# Patient Record
Sex: MALE | Race: WHITE | NOT HISPANIC OR LATINO | Employment: FULL TIME | URBAN - METROPOLITAN AREA
[De-identification: names, ages, dates, MRNs, and addresses within clinical notes are randomized per-mention and may not be internally consistent; named-entity substitution may affect disease eponyms.]

---

## 2018-08-29 ENCOUNTER — APPOINTMENT (OUTPATIENT)
Dept: RADIOLOGY | Facility: MEDICAL CENTER | Age: 38
End: 2018-08-29
Attending: RADIOLOGY
Payer: MEDICAID

## 2018-08-29 ENCOUNTER — APPOINTMENT (OUTPATIENT)
Dept: RADIOLOGY | Facility: MEDICAL CENTER | Age: 38
End: 2018-08-29
Attending: EMERGENCY MEDICINE
Payer: MEDICAID

## 2018-08-29 ENCOUNTER — HOSPITAL ENCOUNTER (EMERGENCY)
Facility: MEDICAL CENTER | Age: 38
End: 2018-08-30
Attending: EMERGENCY MEDICINE | Admitting: ORTHOPAEDIC SURGERY
Payer: MEDICAID

## 2018-08-29 DIAGNOSIS — S62.101A CLOSED FRACTURE OF RIGHT WRIST, INITIAL ENCOUNTER: ICD-10-CM

## 2018-08-29 DIAGNOSIS — Z20.2 STD EXPOSURE: ICD-10-CM

## 2018-08-29 PROCEDURE — 160028 HCHG SURGERY MINUTES - 1ST 30 MINS LEVEL 3: Performed by: ORTHOPAEDIC SURGERY

## 2018-08-29 PROCEDURE — 160048 HCHG OR STATISTICAL LEVEL 1-5: Performed by: ORTHOPAEDIC SURGERY

## 2018-08-29 PROCEDURE — 73100 X-RAY EXAM OF WRIST: CPT | Mod: RT

## 2018-08-29 PROCEDURE — 160009 HCHG ANES TIME/MIN: Performed by: ORTHOPAEDIC SURGERY

## 2018-08-29 PROCEDURE — 700111 HCHG RX REV CODE 636 W/ 250 OVERRIDE (IP): Performed by: EMERGENCY MEDICINE

## 2018-08-29 PROCEDURE — 500881 HCHG PACK, EXTREMITY: Performed by: ORTHOPAEDIC SURGERY

## 2018-08-29 PROCEDURE — 73110 X-RAY EXAM OF WRIST: CPT | Mod: RT

## 2018-08-29 PROCEDURE — 87591 N.GONORRHOEAE DNA AMP PROB: CPT

## 2018-08-29 PROCEDURE — 160035 HCHG PACU - 1ST 60 MINS PHASE I: Performed by: ORTHOPAEDIC SURGERY

## 2018-08-29 PROCEDURE — 160002 HCHG RECOVERY MINUTES (STAT): Performed by: ORTHOPAEDIC SURGERY

## 2018-08-29 PROCEDURE — 160039 HCHG SURGERY MINUTES - EA ADDL 1 MIN LEVEL 3: Performed by: ORTHOPAEDIC SURGERY

## 2018-08-29 PROCEDURE — 160036 HCHG PACU - EA ADDL 30 MINS PHASE I: Performed by: ORTHOPAEDIC SURGERY

## 2018-08-29 PROCEDURE — A9270 NON-COVERED ITEM OR SERVICE: HCPCS

## 2018-08-29 PROCEDURE — 160022 HCHG BLOCK: Performed by: ORTHOPAEDIC SURGERY

## 2018-08-29 PROCEDURE — 501838 HCHG SUTURE GENERAL: Performed by: ORTHOPAEDIC SURGERY

## 2018-08-29 PROCEDURE — 501445 HCHG STAPLER, SKIN DISP: Performed by: ORTHOPAEDIC SURGERY

## 2018-08-29 PROCEDURE — 700111 HCHG RX REV CODE 636 W/ 250 OVERRIDE (IP)

## 2018-08-29 PROCEDURE — 96374 THER/PROPH/DIAG INJ IV PUSH: CPT

## 2018-08-29 PROCEDURE — 700102 HCHG RX REV CODE 250 W/ 637 OVERRIDE(OP)

## 2018-08-29 PROCEDURE — C1713 ANCHOR/SCREW BN/BN,TIS/BN: HCPCS | Performed by: ORTHOPAEDIC SURGERY

## 2018-08-29 PROCEDURE — 87491 CHLMYD TRACH DNA AMP PROBE: CPT

## 2018-08-29 PROCEDURE — 99291 CRITICAL CARE FIRST HOUR: CPT

## 2018-08-29 PROCEDURE — 96375 TX/PRO/DX INJ NEW DRUG ADDON: CPT

## 2018-08-29 DEVICE — SCREW 3.5 MM NON-LOCKING TI X 18MM LONG (6TX8+2TX5=58): Type: IMPLANTABLE DEVICE | Status: FUNCTIONAL

## 2018-08-29 DEVICE — SCREW 2.5 MM NON-LOCKING TI X 20MM LONG (6TX8=48): Type: IMPLANTABLE DEVICE | Status: FUNCTIONAL

## 2018-08-29 DEVICE — SCREW 3.5 MM NON-LOCKING TI X 16MM LONG (6TX8+2TX5=58): Type: IMPLANTABLE DEVICE | Status: FUNCTIONAL

## 2018-08-29 DEVICE — SCREW 3.5 MM NON-LOCKING TI X 14MM LONG (6TX8+2TX5=58): Type: IMPLANTABLE DEVICE | Status: FUNCTIONAL

## 2018-08-29 DEVICE — SCREW 2.5 MM LOCKING TI X 22MM LONG (6TX8=48): Type: IMPLANTABLE DEVICE | Status: FUNCTIONAL

## 2018-08-29 DEVICE — PLATE VOLAR NARROW RIGHT 3HX20X44MM (2TX2=4): Type: IMPLANTABLE DEVICE | Status: FUNCTIONAL

## 2018-08-29 DEVICE — SCREW 2.5 MM LOCKING TI X 20MM LONG (6TX8=48): Type: IMPLANTABLE DEVICE | Status: FUNCTIONAL

## 2018-08-29 RX ORDER — ONDANSETRON 2 MG/ML
4 INJECTION INTRAMUSCULAR; INTRAVENOUS ONCE
Status: COMPLETED | OUTPATIENT
Start: 2018-08-29 | End: 2018-08-29

## 2018-08-29 RX ORDER — MORPHINE SULFATE 4 MG/ML
4 INJECTION, SOLUTION INTRAMUSCULAR; INTRAVENOUS ONCE
Status: COMPLETED | OUTPATIENT
Start: 2018-08-29 | End: 2018-08-29

## 2018-08-29 RX ORDER — BUSPIRONE HYDROCHLORIDE 10 MG/1
10 TABLET ORAL 3 TIMES DAILY
COMMUNITY
End: 2020-01-23

## 2018-08-29 RX ORDER — BUPRENORPHINE HYDROCHLORIDE AND NALOXONE HYDROCHLORIDE DIHYDRATE 8; 2 MG/1; MG/1
1 TABLET SUBLINGUAL DAILY
COMMUNITY
End: 2020-01-23

## 2018-08-29 RX ORDER — CEFTRIAXONE SODIUM 250 MG/1
250 INJECTION, POWDER, FOR SOLUTION INTRAMUSCULAR; INTRAVENOUS ONCE
Status: DISCONTINUED | OUTPATIENT
Start: 2018-08-29 | End: 2018-08-29

## 2018-08-29 RX ORDER — AZITHROMYCIN 500 MG/1
1000 TABLET, FILM COATED ORAL ONCE
Qty: 2 TAB | Refills: 0 | Status: SHIPPED | OUTPATIENT
Start: 2018-08-29 | End: 2018-08-29

## 2018-08-29 RX ORDER — OXYCODONE HYDROCHLORIDE AND ACETAMINOPHEN 5; 325 MG/1; MG/1
TABLET ORAL
Status: COMPLETED
Start: 2018-08-29 | End: 2018-08-29

## 2018-08-29 RX ADMIN — MORPHINE SULFATE 4 MG: 4 INJECTION INTRAVENOUS at 22:15

## 2018-08-29 RX ADMIN — OXYCODONE AND ACETAMINOPHEN 2 TABLET: 5; 325 TABLET ORAL at 23:50

## 2018-08-29 RX ADMIN — FENTANYL CITRATE 50 MCG: 50 INJECTION, SOLUTION INTRAMUSCULAR; INTRAVENOUS at 23:54

## 2018-08-29 RX ADMIN — ONDANSETRON HYDROCHLORIDE 4 MG: 2 INJECTION, SOLUTION INTRAMUSCULAR; INTRAVENOUS at 22:15

## 2018-08-29 ASSESSMENT — PAIN SCALES - GENERAL
PAINLEVEL_OUTOF10: 7
PAINLEVEL_OUTOF10: 7
PAINLEVEL_OUTOF10: 8

## 2018-08-30 VITALS
DIASTOLIC BLOOD PRESSURE: 76 MMHG | OXYGEN SATURATION: 92 % | SYSTOLIC BLOOD PRESSURE: 102 MMHG | TEMPERATURE: 99 F | WEIGHT: 130 LBS | RESPIRATION RATE: 22 BRPM | HEART RATE: 65 BPM

## 2018-08-30 LAB
C TRACH DNA SPEC QL NAA+PROBE: NEGATIVE
N GONORRHOEA DNA SPEC QL NAA+PROBE: POSITIVE
SPECIMEN SOURCE: ABNORMAL

## 2018-08-30 PROCEDURE — 700111 HCHG RX REV CODE 636 W/ 250 OVERRIDE (IP)

## 2018-08-30 RX ORDER — HYDROMORPHONE HYDROCHLORIDE 2 MG/ML
INJECTION, SOLUTION INTRAMUSCULAR; INTRAVENOUS; SUBCUTANEOUS
Status: COMPLETED
Start: 2018-08-30 | End: 2018-08-30

## 2018-08-30 RX ADMIN — HYDROMORPHONE HYDROCHLORIDE 0.5 MG: 2 INJECTION INTRAMUSCULAR; INTRAVENOUS; SUBCUTANEOUS at 01:07

## 2018-08-30 RX ADMIN — FENTANYL CITRATE 50 MCG: 50 INJECTION, SOLUTION INTRAMUSCULAR; INTRAVENOUS at 00:00

## 2018-08-30 RX ADMIN — HYDROMORPHONE HYDROCHLORIDE 0.5 MG: 2 INJECTION INTRAMUSCULAR; INTRAVENOUS; SUBCUTANEOUS at 00:09

## 2018-08-30 RX ADMIN — HYDROMORPHONE HYDROCHLORIDE 0.5 MG: 2 INJECTION INTRAMUSCULAR; INTRAVENOUS; SUBCUTANEOUS at 00:19

## 2018-08-30 RX ADMIN — HYDROMORPHONE HYDROCHLORIDE 0.5 MG: 2 INJECTION INTRAMUSCULAR; INTRAVENOUS; SUBCUTANEOUS at 00:50

## 2018-08-30 RX ADMIN — HYDROMORPHONE HYDROCHLORIDE 0.5 MG: 2 INJECTION INTRAMUSCULAR; INTRAVENOUS; SUBCUTANEOUS at 00:14

## 2018-08-30 RX ADMIN — HYDROMORPHONE HYDROCHLORIDE 0.5 MG: 2 INJECTION INTRAMUSCULAR; INTRAVENOUS; SUBCUTANEOUS at 00:40

## 2018-08-30 RX ADMIN — HYDROMORPHONE HYDROCHLORIDE 0.5 MG: 2 INJECTION INTRAMUSCULAR; INTRAVENOUS; SUBCUTANEOUS at 00:28

## 2018-08-30 ASSESSMENT — PAIN SCALES - GENERAL
PAINLEVEL_OUTOF10: 5
PAINLEVEL_OUTOF10: 3
PAINLEVEL_OUTOF10: 3
PAINLEVEL_OUTOF10: 7
PAINLEVEL_OUTOF10: 6
PAINLEVEL_OUTOF10: 8

## 2018-08-30 NOTE — OR NURSING
Pt A&O. VSS on RA. Pt reports pain tolerable and denies nausea. Right arm in sling per orders. Surgical drsg CDI. CMS intact in right fingers.   Pt ambulating without issues. Voided x1. Tolerating PO.  Pt in police custody at time of d/c.   Pt ambulated off of unit accompanied by 2 Police officers and 1 .

## 2018-08-30 NOTE — DISCHARGE INSTRUCTIONS
Extremity Fracture  Broken bones (fractures) take several weeks to months to heal depending on the bone involved. The broken ends must be lined up correctly and kept in position for proper healing. Do not remove the splint, immobilizer, or cast that has been applied to treat your injury. This is the most important part of your treatment. Other measures to treat fractures include:  · Keeping the injured limb at rest and elevated above your heart as recommended by your caregiver. This will help reduce pain and swelling.   · Ice packs can be applied to your fracture site for 20-30 minutes every 3-4 hours over the next 2-3 days.   · Pain medicine may be prescribed in the first days after a fracture.   SEEK IMMEDIATE MEDICAL CARE IF:  · You develop increasing pain or pressure in the injured limb, or if it becomes cold, numb, or pale.   · There is increasing pain with motion of your fingers or toes.   Document Released: 01/25/2006 Document Revised: 03/11/2013 Document Reviewed: 04/07/2010  [employer]® Patient Information ©2013 [employer]sChlamydia, Male  Chlamydia is an infection. It is spread through sexual contact. Chlamydia can be in different areas of the body. These areas include the urethra, throat, or rectum. It is important to treat chlamydia as soon as possible. It can damage other organs.   CAUSES   Chlamydia is caused by bacteria. It is a sexually transmitted disease. This means that it is passed from an infected partner during intimate contact. This contact could be with the genitals, mouth, or rectal area.   SIGNS AND SYMPTOMS   There may not be any symptoms. This is often the case early in the infection. If there are symptoms, they are usually mild and may only be noticeable in the morning. Symptoms you may notice include:   · Burning with urination.  · Pain or swelling in the testicles.  · Watery mucus-like discharge from the penis.  · Long-standing (chronic) pelvic pain after frequent  infections.  · Pain, swelling, or itching around the anus.  · A sore throat.  · Itching, burning, or redness in the eyes, or discharge from the eyes.  DIAGNOSIS   To diagnose this infection, your health care provider will do a pelvic exam. A sample of urine or a swab from the rectum may be taken for testing.   TREATMENT   Chlamydia is treated with antibiotic medicines. Your health care provider may test you for infection again 3 months after treatment.  HOME CARE INSTRUCTIONS  · Take your antibiotic medicine as directed by your health care provider. Finish the antibiotic even if you start to feel better. Incomplete treatment will put you at risk for not being able to have children (sterility).    · Take medicines only as directed by your health care provider.    · Rest.    · Inform any sexual partners about your infection. Even if they are symptom free or have a negative culture or evaluation, they should be treated for the condition.    · Do not have sex (intercourse) until treatment is completed and your health care provider says it is okay.    · Keep all follow-up visits as directed by your health care provider.    · Not all test results are available during your visit. If your test results are not back during the visit, make an appointment with your health care provider to find out the results. Do not assume everything is normal if you have not heard from your health care provider or the medical facility. It is your responsibility to get your test results.  SEEK MEDICAL CARE IF:  · You develop new joint pain.  · You have a fever.  SEEK IMMEDIATE MEDICAL CARE IF:   · Your pain increases.    · You have abnormal discharge.    · You have pain during intercourse.  MAKE SURE YOU:   · Understand these instructions.  · Will watch your condition.  · Will get help right away if you are not doing well or get worse.  This information is not intended to replace advice given to you by your health care provider. Make sure you  discuss any questions you have with your health care provider.  Document Released: 2006 Document Revised: 2016 Document Reviewed: 2014  [employer] Interactive Patient Education © 2017 [employer]  Gonorrhea  Gonorrhea is an infection that can cause serious problems. If left untreated, the infection may:   · Damage the female or male organs.    · Cause women to be unable to have children (sterility).    · Harm a fetus if the infected woman is pregnant.    It is important to get treatment for gonorrhea as soon as possible. It is also necessary that all your sexual partners be tested for the infection.   CAUSES   Gonorrhea is caused by bacteria called Neisseria gonorrhoeae. The infection is spread from person to person, usually by sexual contact (such as by anal, vaginal, or oral means). A  can contract the infection from his or her mother during birth.   RISK FACTORS  · Being a woman younger than 25 years of age who is sexually active.  · Being a woman 25 years of age or older who has:  ¨ A new sex partner.  ¨ More than one sex partner.  ¨ A sex partner who has a sexually transmitted disease (STD).  · Using condoms inconsistently.  · Currently having, or having previously had, an STD.  · Exchanging sex or money or drugs.  SYMPTOMS   Some people with gonorrhea do not have symptoms. Symptoms may be different in females and males.   Females   The most common symptoms are:   · Pain in the lower abdomen.    · Fever with or without chills.    Other symptoms include:   · Abnormal vaginal discharge.    · Painful intercourse.    · Burning or itching of the vagina or lips of the vagina.    · Abnormal vaginal bleeding.    · Pain when urinating.    · Long-lasting (chronic) pain in the lower abdomen, especially during menstruation or intercourse.    · Inability to become pregnant.    · Going into premature labor.    · Irritation, pain, bleeding, or discharge from the rectum. This may occur if the infection  was spread by anal sex.    · Sore throat or swollen lymph nodes in the neck. This may occur if the infection was spread by oral sex.    Males   The most common symptoms are:   · Discharge from the penis.    · Pain or burning during urination.    · Pain or swelling in the testicles.  Other symptoms may include:   · Irritation, pain, bleeding, or discharge from the rectum. This may occur if the infection was spread by anal sex.    · Sore throat, fever, or swollen lymph nodes in the neck. This may occur if the infection was spread by oral sex.    DIAGNOSIS   A diagnosis is made after a physical exam is done and a sample of discharge is examined under a microscope for the presence of the bacteria. The discharge may be taken from the urethra, cervix, throat, or rectum.   TREATMENT   Gonorrhea is treated with antibiotic medicines. It is important for treatment to begin as soon as possible. Early treatment may prevent some problems from developing. Do not have sex. Avoid all types of sexual activity for 7 days after treatment is complete and until any sex partners have been treated.  HOME CARE INSTRUCTIONS   · Take medicines only as directed by your health care provider.    · Take your antibiotic medicine as directed by your health care provider. Finish the antibiotic even if you start to feel better. Incomplete treatment will put you at risk for continued infection.    · Do not have sex until treatment is complete or as directed by your health care provider.    · Keep all follow-up visits as directed by your health care provider.    · Not all test results are available during your visit. If your test results are not back during the visit, make an appointment with your health care provider to find out the results. Do not assume everything is normal if you have not heard from your health care provider or the medical facility. It is your responsibility to get your test results.  · If you test positive for gonorrhea, inform  your recent sexual partners. They need to be checked for gonorrhea even if they do not have symptoms. They may need treatment, even if they test negative for gonorrhea.    SEEK MEDICAL CARE IF:   · You develop any bad reaction to the medicine you were prescribed. This may include:    ¨ A rash.    ¨ Nausea.    ¨ Vomiting.    ¨ Diarrhea.    · Your symptoms do not improve after a few days of taking antibiotics.    · Your symptoms get worse.    · You develop increased pain, such as in the testicles (for males) or in the abdomen (for females).  · You have a fever.  MAKE SURE YOU:   · Understand these instructions.  · Will watch your condition.  · Will get help right away if you are not doing well or get worse.  This information is not intended to replace advice given to you by your health care provider. Make sure you discuss any questions you have with your health care provider.  Document Released: 12/15/2001 Document Revised: 01/08/2016 Document Reviewed: 06/25/2014  [employer] Interactive Patient Education © 2017 [employer]    ACTIVITY: Rest and take it easy for the first 24 hours.  A responsible adult is recommended to remain with you during that time.  It is normal to feel sleepy.  We encourage you to not do anything that requires balance, judgment or coordination.    MILD FLU-LIKE SYMPTOMS ARE NORMAL. YOU MAY EXPERIENCE GENERALIZED MUSCLE ACHES, THROAT IRRITATION, HEADACHE AND/OR SOME NAUSEA.    FOR 24 HOURS DO NOT:  Drive, operate machinery or run household appliances.  Drink beer or alcoholic beverages.   Make important decisions or sign legal documents.                   SPECIAL INSTRUCTIONS: DISCHARGE INSTRUCTIONS:    ACTIVITY:  The patient should remain non weightbearing with the use of sling    PAIN CONTROL:  The patient has been prescribed a narcotic pain medication.  Side effects of narcotics pain medications include sedation and constipation.  To prevent constipation, it is recommended that the patient  take an over the counter stool softener (such as Colace or Senna) and use laxatives as needed to prevent constipation.  Take these over the counter medications as directed by the packaging.  The patient may not drive while taking narcotic pain medication.  The patient should wean off their prescription pain medication by taking over the counter analgesics (such as Tylenol, Advil, Ibuprofen, etc).  Use caution when taking acetaminophen (Tylenol), as some narcotic medications contain acetaminophen.  The total dose of acetaminophen should not exceed 3000 mg daily.    WOUND CARE:  The patient has a surgical wound which was closed with staples or sutures.  These need to be removed 10-14 days after surgery.  If the patient is not in a splint or cast, the operative dressing should be removed 2 days after surgery, and dry gauze dressing changes should be performed daily after that.  You may shower when the operative dressing is removed.    SPLINT/CAST CARE:  If present, you should keep your splint or cast clean, dry, and intact.  You should elevate your operative extremity to minimize swelling in your fingers or toes.  If the sensation in your fingers or toes of your operative extremity changes, or the fingers/toes change colors, or should your pain become too difficult to control, you should immediately elevate your operative extremity.  If these symptoms do not resolve after 30 minutes to 1 hour, you should seek medical care immediately.    CALL YOUR DOCTOR IF:  You have fever >101.5 degrees F, you have increased or concerning wound drainage, you notice a great deal of redness around your incision, your pain can no longer be controlled, the sensation in your fingers or toes changes and does not respond to elevation, your cast or splint becomes saturated (wet) or other concerns.  If you suffer a medical emergency, seek immediate medical care at your nearest Emergency Room.    DIET: To avoid nausea, slowly advance diet as  tolerated, avoiding spicy or greasy foods for the first day.  Add more substantial food to your diet according to your physician's instructions.  Babies can be fed formula or breast milk as soon as they are hungry.  INCREASE FLUIDS AND FIBER TO AVOID CONSTIPATION.    SURGICAL DRESSING/BATHING: See Above    FOLLOW-UP APPOINTMENT:  A follow-up appointment should be arranged with your doctor; call to schedule.    You should CALL YOUR PHYSICIAN if you develop:  Fever greater than 101 degrees F.  Pain not relieved by medication, or persistent nausea or vomiting.  Excessive bleeding (blood soaking through dressing) or unexpected drainage from the wound.  Extreme redness or swelling around the incision site, drainage of pus or foul smelling drainage.  Inability to urinate or empty your bladder within 8 hours.  Problems with breathing or chest pain.    You should call 911 if you develop problems with breathing or chest pain.  If you are unable to contact your doctor or surgical center, you should go to the nearest emergency room or urgent care center.  Physician's telephone #: [phone]    If any questions arise, call your doctor.  If your doctor is not available, please feel free to call the Surgical Center at [phone].  The Center is open Monday through Friday from 7AM to 7PM.  You can also call the [facility] HOTLINE open 24 hours/day, 7 days/week and speak to a nurse at [phone], or toll free at [phone].    A registered nurse may call you a few days after your surgery to see how you are doing after your procedure.    MEDICATIONS: Resume taking daily medication.  Take prescribed pain medication with food.  If no medication is prescribed, you may take non-aspirin pain medication if needed.  PAIN MEDICATION CAN BE VERY CONSTIPATING.  Take a stool softener or laxative such as senokot, pericolace, or milk of magnesia if needed.    Prescription given for N/A.  Last oral pain medication given at 11:50. 2  percocet    If your physician has prescribed pain medication that includes Acetaminophen (Tylenol), do not take additional Acetaminophen (Tylenol) while taking the prescribed medication.    Depression / Suicide Risk    As you are discharged from this [facility] facility, it is important to learn how to keep safe from harming yourself.    Recognize the warning signs:  · Abrupt changes in personality, positive or negative- including increase in energy   · Giving away possessions  · Change in eating patterns- significant weight changes-  positive or negative  · Change in sleeping patterns- unable to sleep or sleeping all the time   · Unwillingness or inability to communicate  · Depression  · Unusual sadness, discouragement and loneliness  · Talk of wanting to die  · Neglect of personal appearance   · Rebelliousness- reckless behavior  · Withdrawal from people/activities they love  · Confusion- inability to concentrate     If you or a loved one observes any of these behaviors or has concerns about self-harm, here's what you can do:  · Talk about it- your feelings and reasons for harming yourself  · Remove any means that you might use to hurt yourself (examples: pills, rope, extension cords, firearm)  · Get professional help from the community (Mental Health, Substance Abuse, psychological counseling)  · Do not be alone:Call your Safe Contact- someone whom you trust who will be there for you.  · Call your local CRISIS HOTLINE 059-5428 or 380-688-1248  · Call your local Children's Mobile Crisis Response Team [place] (677) 544-0085 or www.lettrs  · Call the toll free National Suicide Prevention Hotlines   · National Suicide Prevention Lifeline 667-872-YWVD (9255)  · National Hope Line Network 800-SUICIDE (060-6215)
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
## 2018-08-30 NOTE — CONSULTS
8/29/2018    Sam Talbot is a 38 y.o. male who presents with a right wrist fracture and is here for operative management. Patient denies numbness, parasthesias, loss of concousness or other trauma    No past medical history on file.    No past surgical history on file.    Medications  No current facility-administered medications on file prior to encounter.      Current Outpatient Prescriptions on File Prior to Encounter   Medication Sig Dispense Refill   • METHADONE by Does not apply route.         Allergies  Iodine    ROS  Right wrist pain and deformity. All other systems were reviewed and found to be negative    No family history on file.    Social History     Social History   • Marital status:      Spouse name: N/A   • Number of children: N/A   • Years of education: N/A     Social History Main Topics   • Smoking status: Not on file   • Smokeless tobacco: Not on file   • Alcohol use Not on file   • Drug use: Unknown   • Sexual activity: Not on file     Other Topics Concern   • Not on file     Social History Narrative   • No narrative on file       Physical Exam  Vitals  Blood pressure (!) 93/74, pulse 68, temperature 36.4 °C (97.5 °F), resp. rate 16, weight 59 kg (130 lb), SpO2 96 %.  General: Well Developed, Well Nourished, no acute distress  HEENT: Normocephalic, atraumatic  Eyes: Anicteric, PERRLA, EOMI  Neck: Supple, nontender, no masses  Lungs: CTA, no wheezes or crackles  Heart: RRR, no murmurs, rubs or gallops  Abdomen: Soft, NT, ND  Pelvis: Stable to AP and Lateral Compression  Skin: Intact, no open wounds  Extremities: Right wrist pain and deformity  Neuro: M/R/U/A intact  Vascular: 2+rad/Uln, Capillary refill <2 seconds    Radiographs:  DX-WRIST-COMPLETE 3+ RIGHT   Final Result      Fractures of the distal radius and ulna      DX-WRIST-LIMITED 2- RIGHT    (Results Pending)   DX-PORTABLE FLUOROSCOPY < 1 HOUR    (Results Pending)       Laboratory Values      No results for input(s):  SODIUM, POTASSIUM, CHLORIDE, CO2, GLUCOSE, BUN, CPKTOTAL in the last 72 hours.          Impression: Right distal radius and ulna fractures    Plan:Operative intervention. Risks and benefits of surgery were discussed which include but are not limited to bleeding, infection, neurovascular damage, malunion, nonunion, instability, limb length discrepancy, DVT, PE, MI, Stroke and death. They understand these risks and wish to proceed.
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
## 2018-08-30 NOTE — ED NOTES
Law enforcement sts they are leaving and to inform the nurses upstairs to give them a call when patient is ready for discharge post surgery / hospital stay or if the patient leaves AMA.
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
## 2020-01-23 ENCOUNTER — HOSPITAL ENCOUNTER (EMERGENCY)
Facility: MEDICAL CENTER | Age: 40
End: 2020-01-23
Attending: EMERGENCY MEDICINE
Payer: MEDICAID

## 2020-01-23 ENCOUNTER — APPOINTMENT (OUTPATIENT)
Dept: RADIOLOGY | Facility: MEDICAL CENTER | Age: 40
End: 2020-01-23
Attending: EMERGENCY MEDICINE
Payer: MEDICAID

## 2020-01-23 VITALS
TEMPERATURE: 97.4 F | SYSTOLIC BLOOD PRESSURE: 119 MMHG | HEIGHT: 73 IN | OXYGEN SATURATION: 92 % | HEART RATE: 93 BPM | DIASTOLIC BLOOD PRESSURE: 78 MMHG | WEIGHT: 126.1 LBS | BODY MASS INDEX: 16.71 KG/M2 | RESPIRATION RATE: 22 BRPM

## 2020-01-23 DIAGNOSIS — J18.9 PNEUMONIA OF RIGHT LOWER LOBE DUE TO INFECTIOUS ORGANISM: ICD-10-CM

## 2020-01-23 DIAGNOSIS — R07.81 PLEURITIC CHEST PAIN: ICD-10-CM

## 2020-01-23 LAB
ALBUMIN SERPL BCP-MCNC: 4.4 G/DL (ref 3.2–4.9)
ALBUMIN/GLOB SERPL: 1.3 G/DL
ALP SERPL-CCNC: 109 U/L (ref 30–99)
ALT SERPL-CCNC: 8 U/L (ref 2–50)
ANION GAP SERPL CALC-SCNC: 12 MMOL/L (ref 0–11.9)
AST SERPL-CCNC: 17 U/L (ref 12–45)
BASOPHILS # BLD AUTO: 0.2 % (ref 0–1.8)
BASOPHILS # BLD: 0.02 K/UL (ref 0–0.12)
BILIRUB SERPL-MCNC: 1.1 MG/DL (ref 0.1–1.5)
BUN SERPL-MCNC: 9 MG/DL (ref 8–22)
CALCIUM SERPL-MCNC: 9.6 MG/DL (ref 8.4–10.2)
CHLORIDE SERPL-SCNC: 94 MMOL/L (ref 96–112)
CO2 SERPL-SCNC: 26 MMOL/L (ref 20–33)
CREAT SERPL-MCNC: 0.64 MG/DL (ref 0.5–1.4)
D DIMER PPP IA.FEU-MCNC: 1.74 UG/ML (FEU) (ref 0–0.5)
EOSINOPHIL # BLD AUTO: 0 K/UL (ref 0–0.51)
EOSINOPHIL NFR BLD: 0 % (ref 0–6.9)
ERYTHROCYTE [DISTWIDTH] IN BLOOD BY AUTOMATED COUNT: 42.6 FL (ref 35.9–50)
GLOBULIN SER CALC-MCNC: 3.3 G/DL (ref 1.9–3.5)
GLUCOSE SERPL-MCNC: 95 MG/DL (ref 65–99)
HCT VFR BLD AUTO: 45.7 % (ref 42–52)
HGB BLD-MCNC: 15.6 G/DL (ref 14–18)
IMM GRANULOCYTES # BLD AUTO: 0.03 K/UL (ref 0–0.11)
IMM GRANULOCYTES NFR BLD AUTO: 0.3 % (ref 0–0.9)
LIPASE SERPL-CCNC: 10 U/L (ref 7–58)
LYMPHOCYTES # BLD AUTO: 0.38 K/UL (ref 1–4.8)
LYMPHOCYTES NFR BLD: 4.4 % (ref 22–41)
MCH RBC QN AUTO: 31.1 PG (ref 27–33)
MCHC RBC AUTO-ENTMCNC: 34.1 G/DL (ref 33.7–35.3)
MCV RBC AUTO: 91.2 FL (ref 81.4–97.8)
MONOCYTES # BLD AUTO: 0.29 K/UL (ref 0–0.85)
MONOCYTES NFR BLD AUTO: 3.4 % (ref 0–13.4)
NEUTROPHILS # BLD AUTO: 7.93 K/UL (ref 1.82–7.42)
NEUTROPHILS NFR BLD: 91.7 % (ref 44–72)
NRBC # BLD AUTO: 0 K/UL
NRBC BLD-RTO: 0 /100 WBC
PLATELET # BLD AUTO: 229 K/UL (ref 164–446)
PMV BLD AUTO: 9.7 FL (ref 9–12.9)
POTASSIUM SERPL-SCNC: 4 MMOL/L (ref 3.6–5.5)
PROT SERPL-MCNC: 7.7 G/DL (ref 6–8.2)
RBC # BLD AUTO: 5.01 M/UL (ref 4.7–6.1)
SODIUM SERPL-SCNC: 132 MMOL/L (ref 135–145)
WBC # BLD AUTO: 8.7 K/UL (ref 4.8–10.8)

## 2020-01-23 PROCEDURE — 99285 EMERGENCY DEPT VISIT HI MDM: CPT

## 2020-01-23 PROCEDURE — 71045 X-RAY EXAM CHEST 1 VIEW: CPT

## 2020-01-23 PROCEDURE — 85379 FIBRIN DEGRADATION QUANT: CPT

## 2020-01-23 PROCEDURE — 96375 TX/PRO/DX INJ NEW DRUG ADDON: CPT

## 2020-01-23 PROCEDURE — 700105 HCHG RX REV CODE 258: Performed by: EMERGENCY MEDICINE

## 2020-01-23 PROCEDURE — 71275 CT ANGIOGRAPHY CHEST: CPT

## 2020-01-23 PROCEDURE — 700111 HCHG RX REV CODE 636 W/ 250 OVERRIDE (IP): Performed by: EMERGENCY MEDICINE

## 2020-01-23 PROCEDURE — 85025 COMPLETE CBC W/AUTO DIFF WBC: CPT

## 2020-01-23 PROCEDURE — 36415 COLL VENOUS BLD VENIPUNCTURE: CPT

## 2020-01-23 PROCEDURE — 700117 HCHG RX CONTRAST REV CODE 255: Performed by: EMERGENCY MEDICINE

## 2020-01-23 PROCEDURE — 80053 COMPREHEN METABOLIC PANEL: CPT

## 2020-01-23 PROCEDURE — 87040 BLOOD CULTURE FOR BACTERIA: CPT | Mod: 91

## 2020-01-23 PROCEDURE — 83690 ASSAY OF LIPASE: CPT

## 2020-01-23 PROCEDURE — 96365 THER/PROPH/DIAG IV INF INIT: CPT

## 2020-01-23 RX ORDER — MORPHINE SULFATE 4 MG/ML
4 INJECTION, SOLUTION INTRAMUSCULAR; INTRAVENOUS ONCE
Status: COMPLETED | OUTPATIENT
Start: 2020-01-23 | End: 2020-01-23

## 2020-01-23 RX ORDER — AZITHROMYCIN 250 MG/1
TABLET, FILM COATED ORAL
Qty: 1 QUANTITY SUFFICIENT | Refills: 0 | Status: SHIPPED | OUTPATIENT
Start: 2020-01-23

## 2020-01-23 RX ORDER — DIPHENHYDRAMINE HYDROCHLORIDE 50 MG/ML
50 INJECTION INTRAMUSCULAR; INTRAVENOUS ONCE
Status: COMPLETED | OUTPATIENT
Start: 2020-01-23 | End: 2020-01-23

## 2020-01-23 RX ORDER — KETOROLAC TROMETHAMINE 30 MG/ML
30 INJECTION, SOLUTION INTRAMUSCULAR; INTRAVENOUS ONCE
Status: COMPLETED | OUTPATIENT
Start: 2020-01-23 | End: 2020-01-23

## 2020-01-23 RX ORDER — IBUPROFEN 800 MG/1
800 TABLET ORAL EVERY 8 HOURS PRN
COMMUNITY

## 2020-01-23 RX ORDER — BUPRENORPHINE AND NALOXONE 8; 2 MG/1; MG/1
1 FILM, SOLUBLE BUCCAL; SUBLINGUAL 2 TIMES DAILY
COMMUNITY

## 2020-01-23 RX ORDER — ONDANSETRON 2 MG/ML
4 INJECTION INTRAMUSCULAR; INTRAVENOUS ONCE
Status: COMPLETED | OUTPATIENT
Start: 2020-01-23 | End: 2020-01-23

## 2020-01-23 RX ORDER — AMOXICILLIN 500 MG/1
2000 CAPSULE ORAL 2 TIMES DAILY
Qty: 40 CAP | Refills: 0 | Status: SHIPPED | OUTPATIENT
Start: 2020-01-23 | End: 2020-01-28

## 2020-01-23 RX ADMIN — CEFTRIAXONE SODIUM 2 G: 2 INJECTION, POWDER, FOR SOLUTION INTRAMUSCULAR; INTRAVENOUS at 11:05

## 2020-01-23 RX ADMIN — HYDROCORTISONE SODIUM SUCCINATE 200 MG: 100 INJECTION, POWDER, FOR SOLUTION INTRAMUSCULAR; INTRAVENOUS at 09:07

## 2020-01-23 RX ADMIN — ONDANSETRON 4 MG: 2 INJECTION INTRAMUSCULAR; INTRAVENOUS at 08:40

## 2020-01-23 RX ADMIN — MORPHINE SULFATE 4 MG: 4 INJECTION INTRAVENOUS at 08:40

## 2020-01-23 RX ADMIN — IOHEXOL 85 ML: 350 INJECTION, SOLUTION INTRAVENOUS at 09:48

## 2020-01-23 RX ADMIN — DIPHENHYDRAMINE HYDROCHLORIDE 50 MG: 50 INJECTION INTRAMUSCULAR; INTRAVENOUS at 09:07

## 2020-01-23 RX ADMIN — KETOROLAC TROMETHAMINE 30 MG: 30 INJECTION, SOLUTION INTRAMUSCULAR at 09:59

## 2020-01-23 ASSESSMENT — LIFESTYLE VARIABLES: DO YOU DRINK ALCOHOL: NO

## 2020-01-23 NOTE — ED NOTES
Pt received d/c instr; pt verbalized understanding. Pt called dad for a ride home. Pt amb to lobby per self.

## 2020-01-23 NOTE — ED TRIAGE NOTES
"Chief Complaint   Patient presents with   • RUQ Pain     started last night     Pt reports that he has \"pneumonia.\" states that it was the same last year when he had it. Pt holding RUQ in triage.   /89   Pulse (!) 104   Temp 36.3 °C (97.4 °F) (Temporal)   Resp (!) 22   Ht 1.854 m (6' 1\")   Wt 57.2 kg (126 lb 1.7 oz)   SpO2 96%   Pt informed of wait times. Educated on triage process.  Asked to return to triage RN for any new or worsening of symptoms. Thanked for patience.        "

## 2020-01-23 NOTE — ED PROVIDER NOTES
"ED Provider Note    CHIEF COMPLAINT  Chief Complaint   Patient presents with   • RUQ Pain     started last night       HPI  Sam Talbot is a 39 y.o. male who presents with 3 days of right anterior lower chest pain, associated productive cough, pain with breathing.  He states it feels similar to pneumonia he had last year.  He denies prior abdominal surgery, denies abdominal pain or pain with eating.  He is holding his right anterior lower chest.  Patient describes the pain is sharp, constant, at times worse with movement.  Pain worsened last night.  No change in bowel habits.  No vomiting.  Patient is a smoker, denies other drug use other than marijuana.  Patient denies trauma    REVIEW OF SYSTEMS    Constitutional: Denies fever  Respiratory: Cough, shortness of breath, pleurisy  Cardiac: No syncope  Gastrointestinal: No abdominal pain, no vomiting  Musculoskeletal: Rib pain  Neurologic: Denies headache       All other systems are negative.       PAST MEDICAL HISTORY  Past Medical History:   Diagnosis Date   • Anxiety    • Seizures (HCC)     \"from panic attack\"       FAMILY HISTORY  History reviewed. No pertinent family history.    SOCIAL HISTORY  Social History     Socioeconomic History   • Marital status:      Spouse name: Not on file   • Number of children: Not on file   • Years of education: Not on file   • Highest education level: Not on file   Occupational History   • Not on file   Social Needs   • Financial resource strain: Not on file   • Food insecurity:     Worry: Not on file     Inability: Not on file   • Transportation needs:     Medical: Not on file     Non-medical: Not on file   Tobacco Use   • Smoking status: Current Every Day Smoker     Packs/day: 0.00   • Smokeless tobacco: Never Used   Substance and Sexual Activity   • Alcohol use: Yes     Comment: daily 12 pack   • Drug use: Yes     Comment: marijuana,   • Sexual activity: Not on file   Lifestyle   • Physical activity:     Days " "per week: Not on file     Minutes per session: Not on file   • Stress: Not on file   Relationships   • Social connections:     Talks on phone: Not on file     Gets together: Not on file     Attends Gnosticism service: Not on file     Active member of club or organization: Not on file     Attends meetings of clubs or organizations: Not on file     Relationship status: Not on file   • Intimate partner violence:     Fear of current or ex partner: Not on file     Emotionally abused: Not on file     Physically abused: Not on file     Forced sexual activity: Not on file   Other Topics Concern   • Not on file   Social History Narrative   • Not on file       SURGICAL HISTORY  Past Surgical History:   Procedure Laterality Date   • RADIUS ULNA ORIF Right 8/29/2018    Procedure: RADIUS ULNA ORIF;  Surgeon: Juan Daniel Garcia M.D.;  Location: SURGERY [facility];  Service: Orthopedics       CURRENT MEDICATIONS  Home Medications    **Home medications have not yet been reviewed for this encounter**         ALLERGIES  Allergies   Allergen Reactions   • Iodine        PHYSICAL EXAM  VITAL SIGNS: /89   Pulse (!) 104   Temp 36.3 °C (97.4 °F) (Temporal)   Resp (!) 22   Ht 1.854 m (6' 1\")   Wt 57.2 kg (126 lb 1.7 oz)   SpO2 96%   BMI 16.64 kg/m²   Constitutional: Anxious and uncomfortable appearance.   HENT: No facial swelling, no epistaxis  Eyes: Anicteric, no conjunctivitis.     Cardiovascular: Tachycardic heart rate, Normal rhythm  Pulmonary:  No wheezing, No rales.  Equal air movement.  Splinting during respiration  Gastrointestinal: Soft, No tenderness, No masses.  Negative Vargas sign.  Skin: Warm, Dry, No cyanosis.   Neurologic: Speech is clear, follows commands, facial expression is symmetrical.  Sensation intact.  Psychiatric: Anxious, and cooperative.  Normal affect  Musculoskeletal: Mild right lower anterior chest wall tenderness without crepitance or deformity    EKG/Labs  Results for orders placed or " performed during the hospital encounter of 01/23/20   CBC WITH DIFFERENTIAL   Result Value Ref Range    WBC 8.7 4.8 - 10.8 K/uL    RBC 5.01 4.70 - 6.10 M/uL    Hemoglobin 15.6 14.0 - 18.0 g/dL    Hematocrit 45.7 42.0 - 52.0 %    MCV 91.2 81.4 - 97.8 fL    MCH 31.1 27.0 - 33.0 pg    MCHC 34.1 33.7 - 35.3 g/dL    RDW 42.6 35.9 - 50.0 fL    Platelet Count 229 164 - 446 K/uL    MPV 9.7 9.0 - 12.9 fL    Neutrophils-Polys 91.70 (H) 44.00 - 72.00 %    Lymphocytes 4.40 (L) 22.00 - 41.00 %    Monocytes 3.40 0.00 - 13.40 %    Eosinophils 0.00 0.00 - 6.90 %    Basophils 0.20 0.00 - 1.80 %    Immature Granulocytes 0.30 0.00 - 0.90 %    Nucleated RBC 0.00 /100 WBC    Neutrophils (Absolute) 7.93 (H) 1.82 - 7.42 K/uL    Lymphs (Absolute) 0.38 (L) 1.00 - 4.80 K/uL    Monos (Absolute) 0.29 0.00 - 0.85 K/uL    Eos (Absolute) 0.00 0.00 - 0.51 K/uL    Baso (Absolute) 0.02 0.00 - 0.12 K/uL    Immature Granulocytes (abs) 0.03 0.00 - 0.11 K/uL    NRBC (Absolute) 0.00 K/uL   COMP METABOLIC PANEL   Result Value Ref Range    Sodium 132 (L) 135 - 145 mmol/L    Potassium 4.0 3.6 - 5.5 mmol/L    Chloride 94 (L) 96 - 112 mmol/L    Co2 26 20 - 33 mmol/L    Anion Gap 12.0 (H) 0.0 - 11.9    Glucose 95 65 - 99 mg/dL    Bun 9 8 - 22 mg/dL    Creatinine 0.64 0.50 - 1.40 mg/dL    Calcium 9.6 8.4 - 10.2 mg/dL    AST(SGOT) 17 12 - 45 U/L    ALT(SGPT) 8 2 - 50 U/L    Alkaline Phosphatase 109 (H) 30 - 99 U/L    Total Bilirubin 1.1 0.1 - 1.5 mg/dL    Albumin 4.4 3.2 - 4.9 g/dL    Total Protein 7.7 6.0 - 8.2 g/dL    Globulin 3.3 1.9 - 3.5 g/dL    A-G Ratio 1.3 g/dL   LIPASE   Result Value Ref Range    Lipase 10 7 - 58 U/L   D-DIMER   Result Value Ref Range    D-Dimer Screen 1.74 (H) 0.00 - 0.50 ug/mL (FEU)   ESTIMATED GFR   Result Value Ref Range    GFR If African American >60 >60 mL/min/1.73 m 2    GFR If Non African American >60 >60 mL/min/1.73 m 2         RADIOLOGY/PROCEDURES  CT-CTA CHEST PULMONARY ARTERY W/ RECONS   Final Result      1.  No pulmonary  embolus is identified.      2.  Opacity in the medial right lower lobe is consistent with pneumonia with associated mucous plugging. Follow-up imaging is recommended to document resolution.            DX-CHEST-PORTABLE (1 VIEW)   Final Result      No acute cardiopulmonary findings.            COURSE & MEDICAL DECISION MAKING  Pertinent Labs & Imaging studies reviewed. (See chart for details)  Patient stated he felt like pneumonia however chest x-ray was negative and with positive d-dimer, CT scan obtained to rule out pulmonary embolism.  This was negative for PE however demonstrated right lower lobe pneumonia.  The patient is prescribed antibiotics, discharged in stable condition.  Pulse oximetry normal, well-appearing.  Patient has agreed to return if worse or for any concerns and I recommended he follow-up with a doctor by Monday if no improvement.    FINAL IMPRESSION     1. Pneumonia of right lower lobe due to infectious organism (HCC)     2. Pleuritic chest pain                     Electronically signed by: Sean Singh M.D., 1/23/2020 8:22 AM

## 2020-01-23 NOTE — ED NOTES
Med Rec completed per patient   Allergies reviewed  No ORAL antibiotics in last 14 days    [facility]

## 2020-01-23 NOTE — DISCHARGE INSTRUCTIONS
Return for shortness of breath, any concerns.    See a doctor for recheck if not better in 1 week.

## 2020-01-28 LAB
BACTERIA BLD CULT: NORMAL
BACTERIA BLD CULT: NORMAL
SIGNIFICANT IND 70042: NORMAL
SIGNIFICANT IND 70042: NORMAL
SITE SITE: NORMAL
SITE SITE: NORMAL
SOURCE SOURCE: NORMAL
SOURCE SOURCE: NORMAL

## 2022-03-11 NOTE — ED NOTES
ED Positive Culture Follow-up/Notification Note:    Date: 8/31/18     Patient seen in the ED on 8/29/2018 for right wrist pain and thinking he may have gonorrhea - buringing with urination that has been going on for the past few days. Taken to the OR for wrist fracture.     1. Closed fracture of right wrist, initial encounter    2. STD exposure         Allergies: Iodine     Vitals:    08/30/18 0100 08/30/18 0115 08/30/18 0130 08/30/18 0200   BP:       Pulse:       Resp: 20 (!) 33 (!) 22    Temp:    37.2 °C (99 °F)   SpO2: 94% 93% 91% 92%   Weight:           Final cultures:   Results     Procedure Component Value Units Date/Time    CHLAMYDIA/GC PCR URINE OR SWAB [[id number]]  (Abnormal) Collected:  08/29/18 2035    Order Status:  Completed Specimen:  Urine from Urine, First Catch Updated:  08/30/18 1958     Source Urine     C. trachomatis by PCR Negative     N. gonorrhoeae by PCR POSITIVE (A)          Plan:   Patient is not currently treated for gonorrhea.   I have attempted to contact the patient with results but his phone number is disconnected. He is now incarcerated at the [facility].  I have contacted the [facility] Dept regarding his need for treatment and they have sent in a notice to the [facility] to get the patient treated with both ceftriaxone/azithromycin.    Cynthia Mcgowan     wheelchair

## 2023-12-15 ENCOUNTER — OFFICE VISIT (OUTPATIENT)
Dept: URGENT CARE | Facility: CLINIC | Age: 43
End: 2023-12-15
Payer: COMMERCIAL

## 2023-12-15 VITALS
BODY MASS INDEX: 18.9 KG/M2 | RESPIRATION RATE: 16 BRPM | HEART RATE: 73 BPM | DIASTOLIC BLOOD PRESSURE: 50 MMHG | OXYGEN SATURATION: 94 % | HEIGHT: 73 IN | TEMPERATURE: 97.7 F | WEIGHT: 142.6 LBS | SYSTOLIC BLOOD PRESSURE: 92 MMHG

## 2023-12-15 DIAGNOSIS — H60.502 ACUTE OTITIS EXTERNA OF LEFT EAR, UNSPECIFIED TYPE: ICD-10-CM

## 2023-12-15 DIAGNOSIS — H69.93 DYSFUNCTION OF BOTH EUSTACHIAN TUBES: ICD-10-CM

## 2023-12-15 DIAGNOSIS — H66.002 NON-RECURRENT ACUTE SUPPURATIVE OTITIS MEDIA OF LEFT EAR WITHOUT SPONTANEOUS RUPTURE OF TYMPANIC MEMBRANE: ICD-10-CM

## 2023-12-15 PROCEDURE — 3078F DIAST BP <80 MM HG: CPT | Performed by: PHYSICIAN ASSISTANT

## 2023-12-15 PROCEDURE — 3074F SYST BP LT 130 MM HG: CPT | Performed by: PHYSICIAN ASSISTANT

## 2023-12-15 PROCEDURE — 99203 OFFICE O/P NEW LOW 30 MIN: CPT | Performed by: PHYSICIAN ASSISTANT

## 2023-12-15 RX ORDER — AMOXICILLIN AND CLAVULANATE POTASSIUM 875; 125 MG/1; MG/1
1 TABLET, FILM COATED ORAL 2 TIMES DAILY
Qty: 14 TABLET | Refills: 0 | Status: SHIPPED | OUTPATIENT
Start: 2023-12-15 | End: 2023-12-22

## 2023-12-15 RX ORDER — OFLOXACIN 3 MG/ML
5 SOLUTION AURICULAR (OTIC) DAILY
Qty: 10 ML | Refills: 0 | Status: SHIPPED | OUTPATIENT
Start: 2023-12-15 | End: 2023-12-22

## 2023-12-15 RX ORDER — FLUTICASONE PROPIONATE 50 MCG
1 SPRAY, SUSPENSION (ML) NASAL DAILY
Qty: 15.8 ML | Refills: 0 | Status: SHIPPED | OUTPATIENT
Start: 2023-12-15

## 2023-12-15 RX ORDER — CETIRIZINE HYDROCHLORIDE 10 MG/1
10 TABLET ORAL DAILY
Qty: 30 TABLET | Refills: 0 | Status: SHIPPED | OUTPATIENT
Start: 2023-12-15

## 2023-12-16 ASSESSMENT — ENCOUNTER SYMPTOMS
CHILLS: 0
NAUSEA: 0
SORE THROAT: 0
VOMITING: 0
FEVER: 0
HEADACHES: 0

## 2023-12-17 NOTE — PROGRESS NOTES
Subjective     Sam Talbot is a 43 y.o. male who presents with Otalgia (X 1.5 week. Bilateral ear pain, feeling of fullness, painful/)    HPI:  Sam Talbot is a 43 y.o. male who presents today for patient ear pain.  Patient notes that he has been having a feeling of ear discomfort and fullness for the past 1 to 2 weeks.  It started to get worse in his left ear this week and he tried using a wax removal kit and Q-tips to clean it out.  Since trying that he feels as though it has actually gotten worse.  His left ear feels almost completely muffled and it is painful.        Review of Systems   Constitutional:  Negative for chills and fever.   HENT:  Positive for congestion, ear pain and hearing loss. Negative for ear discharge and sore throat.    Gastrointestinal:  Negative for nausea and vomiting.   Neurological:  Negative for headaches.           PMH:  has a past medical history of Anxiety and Seizures ([facility]).  MEDS:   Current Outpatient Medications:     amoxicillin-clavulanate (AUGMENTIN) 875-125 MG Tab, Take 1 Tablet by mouth 2 times a day for 7 days., Disp: 14 Tablet, Rfl: 0    cetirizine (ZYRTEC) 10 MG Tab, Take 1 Tablet by mouth every day., Disp: 30 Tablet, Rfl: 0    fluticasone (FLONASE) 50 MCG/ACT nasal spray, Administer 1 Spray into affected nostril(S) every day., Disp: 15.8 mL, Rfl: 0    ofloxacin otic sol (FLOXIN OTIC) 0.3 % Solution, Administer 5 Drops into the left ear every day for 7 days., Disp: 10 mL, Rfl: 0    Buprenorphine HCl-Naloxone HCl (SUBOXONE) 8-2 MG FILM, Place 1 Film under tongue 2 Times a Day., Disp: , Rfl:     ibuprofen (MOTRIN) 800 MG Tab, Take 800 mg by mouth every 8 hours as needed. (Patient not taking: Reported on 12/15/2023), Disp: , Rfl:     azithromycin (ZITHROMAX) 250 MG Tab, Take 2 tablets (500 mg) PO on day 1 and then take 1 tablet (250 mg) daily on 2-5 (Patient not taking: Reported on 12/15/2023), Disp: 1 Quantity Sufficient, Rfl: 0  ALLERGIES:   Allergies  "  Allergen Reactions    Iodine Swelling     \"swelling in my face\"     SURGHX:   Past Surgical History:   Procedure Laterality Date    ORIF, FRACTURE, RADIUS AND ULNA Right 8/29/2018    Procedure: RADIUS ULNA ORIF;  Surgeon: Juan Daniel Garcia M.D.;  Location: SURGERY [facility];  Service: Orthopedics     SOCHX:  reports that he has been smoking cigarettes. He has never used smokeless tobacco. He reports current alcohol use. He reports current drug use.  FH: Family history was reviewed, no pertinent findings to report        Objective     BP 92/50   Pulse 73   Temp 36.5 °C (97.7 °F) (Temporal)   Resp 16   Ht 1.854 m (6' 1\")   Wt 64.7 kg (142 lb 9.6 oz)   SpO2 94%   BMI 18.81 kg/m²      Physical Exam  Constitutional:       General: He is not in acute distress.     Appearance: He is not diaphoretic.   HENT:      Head: Normocephalic and atraumatic.      Right Ear: Tympanic membrane, ear canal and external ear normal.      Left Ear: External ear normal. Swelling and tenderness present. Tympanic membrane is erythematous and bulging. Tympanic membrane is not perforated.   Eyes:      Conjunctiva/sclera: Conjunctivae normal.      Pupils: Pupils are equal, round, and reactive to light.   Pulmonary:      Effort: Pulmonary effort is normal. No respiratory distress.   Musculoskeletal:      Cervical back: Normal range of motion.   Skin:     Findings: No rash.   Neurological:      Mental Status: He is alert and oriented to person, place, and time.   Psychiatric:         Mood and Affect: Mood and affect normal.         Cognition and Memory: Memory normal.         Judgment: Judgment normal.             Assessment & Plan       1. Non-recurrent acute suppurative otitis media of left ear without spontaneous rupture of tympanic membrane  - amoxicillin-clavulanate (AUGMENTIN) 875-125 MG Tab; Take 1 Tablet by mouth 2 times a day for 7 days.  Dispense: 14 Tablet; Refill: 0    2. Acute otitis externa of left ear, unspecified " type  - ofloxacin otic sol (FLOXIN OTIC) 0.3 % Solution; Administer 5 Drops into the left ear every day for 7 days.  Dispense: 10 mL; Refill: 0    3. Dysfunction of both eustachian tubes  - cetirizine (ZYRTEC) 10 MG Tab; Take 1 Tablet by mouth every day.  Dispense: 30 Tablet; Refill: 0  - fluticasone (FLONASE) 50 MCG/ACT nasal spray; Administer 1 Spray into affected nostril(S) every day.  Dispense: 15.8 mL; Refill: 0       Patient with some discomfort for the past 2 weeks or so.  Likely some underlying eustachian tube dysfunction.  On exam today, her, he was also noted to have a left otitis media and left otitis externa.  This will be treated with oral antibiotics and antibiotic eardrops.  To treat the underlying eustachian dysfunction once daily cetirizine and once daily particular nasal spray also sent to pharmacy.  Recommend trialing this for the next 2 weeks.  Additionally he may use OTC analgesics to help with pain.  He should try to keep the left ear as dry as possible during the next week and I would recommend avoiding any use of earplugs or ear buds during this time as well.

## 2024-07-21 ENCOUNTER — APPOINTMENT (OUTPATIENT)
Dept: RADIOLOGY | Facility: IMAGING CENTER | Age: 44
End: 2024-07-21
Attending: NURSE PRACTITIONER
Payer: COMMERCIAL

## 2024-07-21 ENCOUNTER — OFFICE VISIT (OUTPATIENT)
Dept: URGENT CARE | Facility: CLINIC | Age: 44
End: 2024-07-21
Payer: COMMERCIAL

## 2024-07-21 VITALS
BODY MASS INDEX: 16.96 KG/M2 | WEIGHT: 128 LBS | OXYGEN SATURATION: 96 % | DIASTOLIC BLOOD PRESSURE: 56 MMHG | HEART RATE: 86 BPM | TEMPERATURE: 97.3 F | RESPIRATION RATE: 16 BRPM | HEIGHT: 73 IN | SYSTOLIC BLOOD PRESSURE: 98 MMHG

## 2024-07-21 DIAGNOSIS — S69.91XA INJURY OF FINGER OF RIGHT HAND, INITIAL ENCOUNTER: ICD-10-CM

## 2024-07-21 PROCEDURE — 3078F DIAST BP <80 MM HG: CPT | Performed by: NURSE PRACTITIONER

## 2024-07-21 PROCEDURE — 3074F SYST BP LT 130 MM HG: CPT | Performed by: NURSE PRACTITIONER

## 2024-07-21 PROCEDURE — 99213 OFFICE O/P EST LOW 20 MIN: CPT | Performed by: NURSE PRACTITIONER

## 2024-07-21 PROCEDURE — 73140 X-RAY EXAM OF FINGER(S): CPT | Mod: TC,RT | Performed by: RADIOLOGY

## 2024-07-21 ASSESSMENT — ENCOUNTER SYMPTOMS: JOINT SWELLING: 1

## 2025-07-15 ENCOUNTER — OFFICE VISIT (OUTPATIENT)
Dept: URGENT CARE | Facility: CLINIC | Age: 45
End: 2025-07-15
Payer: COMMERCIAL

## 2025-07-15 ENCOUNTER — APPOINTMENT (OUTPATIENT)
Dept: RADIOLOGY | Facility: IMAGING CENTER | Age: 45
End: 2025-07-15
Attending: STUDENT IN AN ORGANIZED HEALTH CARE EDUCATION/TRAINING PROGRAM
Payer: COMMERCIAL

## 2025-07-15 VITALS
HEIGHT: 73 IN | HEART RATE: 104 BPM | BODY MASS INDEX: 16.44 KG/M2 | DIASTOLIC BLOOD PRESSURE: 76 MMHG | TEMPERATURE: 97.8 F | SYSTOLIC BLOOD PRESSURE: 124 MMHG | OXYGEN SATURATION: 97 % | RESPIRATION RATE: 16 BRPM | WEIGHT: 124 LBS

## 2025-07-15 DIAGNOSIS — J18.9 PNEUMONIA OF LEFT LOWER LOBE DUE TO INFECTIOUS ORGANISM: Primary | ICD-10-CM

## 2025-07-15 DIAGNOSIS — R00.0 SINUS TACHYCARDIA: ICD-10-CM

## 2025-07-15 DIAGNOSIS — F17.200 CURRENT SMOKER: ICD-10-CM

## 2025-07-15 DIAGNOSIS — J40 BRONCHITIS: ICD-10-CM

## 2025-07-15 PROCEDURE — 71046 X-RAY EXAM CHEST 2 VIEWS: CPT | Mod: TC | Performed by: RADIOLOGY

## 2025-07-15 PROCEDURE — 3078F DIAST BP <80 MM HG: CPT | Performed by: STUDENT IN AN ORGANIZED HEALTH CARE EDUCATION/TRAINING PROGRAM

## 2025-07-15 PROCEDURE — 3074F SYST BP LT 130 MM HG: CPT | Performed by: STUDENT IN AN ORGANIZED HEALTH CARE EDUCATION/TRAINING PROGRAM

## 2025-07-15 PROCEDURE — 99214 OFFICE O/P EST MOD 30 MIN: CPT | Performed by: STUDENT IN AN ORGANIZED HEALTH CARE EDUCATION/TRAINING PROGRAM

## 2025-07-15 RX ORDER — PREDNISONE 20 MG/1
40 TABLET ORAL DAILY
Qty: 10 TABLET | Refills: 0 | Status: SHIPPED | OUTPATIENT
Start: 2025-07-15 | End: 2025-07-20

## 2025-07-15 RX ORDER — DOXYCYCLINE HYCLATE 100 MG
100 TABLET ORAL 2 TIMES DAILY
Qty: 10 TABLET | Refills: 0 | Status: SHIPPED | OUTPATIENT
Start: 2025-07-15 | End: 2025-07-20

## 2025-07-15 NOTE — PROGRESS NOTES
Subjective:   CHIEF COMPLAINT  Chief Complaint   Patient presents with    Cough     Hx of pneumonia, sx started about 3 weeks or for about a month now, shortness of breath, coughing up brown mucus, body aches, poss fevers, head aches, ear clogged, chest is heavy,        HPI    History of Present Illness  Sam Talbot is a 45 y.o. male who presents for evaluation of a cough. He is accompanied by his son.    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 The cough intensifies during the day while he is at work and seems to improve overnight.  Also reports he has been experiencing some worsening of shortness of breath over the last couple of days with associated left posterior thorax/lung pain.  He is not experiencing any chest pain or pleurisy.  States his cough has been productive with brownish-green sputum.  No hemoptysis.  He does not experience any heartburn, acid reflux, or indigestion. He reports no fevers and maintains a good appetite. He has not sought any medical treatment for this cough but has been taking vitamin C and maintaining an organic diet. He has not used any inhalers recently but was prescribed one in his youth due to mold exposure; no history of asthma he reports no history of blood clots or pulmonary embolisms. He smokes cigarettes, although less frequently now, and has attempted to quit in the past. He is currently on Suboxone and uses marijuana.  He consumed some beer today but does not drink alcohol daily. He does not have a primary care physician. He had pneumonia four times in 2019, requiring hospitalization twice.    Social History:  Occupations:   Diet: Organic diet  Alcohol: Consumed some beer today, does not drink alcohol daily  Tobacco: Smokes cigarettes, less frequently now  Recreational Drugs: Uses marijuana        REVIEW OF SYSTEMS  General: no fever or  "chills  GI: no nausea or vomiting  See HPI for further details.    PAST MEDICAL HISTORY  There are no active problems to display for this patient.      SURGICAL HISTORY   has a past surgical history that includes orif, fracture, radius and ulna (Right, 8/29/2018).    ALLERGIES  Allergies[1]    CURRENT MEDICATIONS  Buprenorphine HCl-Naloxone HCl Film    SOCIAL HISTORY  Social History     Tobacco Use    Smoking status: Every Day     Current packs/day: 0.50     Types: Cigarettes    Smokeless tobacco: Never   Vaping Use    Vaping status: Never Used   Substance and Sexual Activity    Alcohol use: Yes     Alcohol/week: 0.6 oz     Types: 1 Cans of beer per week     Comment: occ    Drug use: Yes     Types: Marijuana     Comment: marijuana,    Sexual activity: Not on file       FAMILY HISTORY  History reviewed. No pertinent family history.       Objective:   PHYSICAL EXAM  VITAL SIGNS: /76   Pulse (!) 104   Temp 36.6 °C (97.8 °F) (Temporal)   Resp 16   Ht 1.854 m (6' 1\")   Wt 56.2 kg (124 lb)   SpO2 97%   BMI 16.36 kg/m²     Gen: no acute distress, normal voice  Skin: dry, intact, moist mucosal membranes  Eyes: No conjunctival injection bilaterally.  Neck: Normal range of motion. No meningeal signs.   Lungs: No increased work of breathing.  CTAB w/ symmetric expansion  CV: sinus tachy w/o murmurs or clicks  Psych: normal affect, normal judgement, alert, awake      RADIOLOGY RESULTS   DX-CHEST-2 VIEWS  Result Date: 7/15/2025  7/15/2025 3:14 PM HISTORY/REASON FOR EXAM: Cough TECHNIQUE/EXAM DESCRIPTION AND NUMBER OF VIEWS: Two views of the chest. COMPARISON: None. FINDINGS: There is an area of consolidation within the left lower lobe. The heart is normal in size. There is no evidence of pleural effusion. Soft tissues and bony structures are unremarkable.     Left lower lobe pneumonia.             Assessment/Plan:     1. Pneumonia of left lower lobe due to infectious organism  DX-CHEST-2 VIEWS    Referral back to " "PCP    amoxicillin-clavulanate (AUGMENTIN) 875-125 MG Tab    predniSONE (DELTASONE) 20 MG Tab    doxycycline (VIBRAMYCIN) 100 MG Tab      2. Current smoker  amoxicillin-clavulanate (AUGMENTIN) 875-125 MG Tab    predniSONE (DELTASONE) 20 MG Tab    doxycycline (VIBRAMYCIN) 100 MG Tab      3. Sinus tachycardia  amoxicillin-clavulanate (AUGMENTIN) 875-125 MG Tab    predniSONE (DELTASONE) 20 MG Tab    doxycycline (VIBRAMYCIN) 100 MG Tab      1) Per chest x-ray on 5/15/2025 with consolidation along the left lower lobe.  I did not appreciate any crackles or adventitious sounds on exam.  Comorbidities include current tobacco use, with guidelines recommending dual therapy antibiotics.  No allergies to medications.  Slightly tachycardic but afebrile without any hypoxia or signs of respiratory distress.  Patient appeared older than his stated age but was overall well-appearing and nontoxic.  -Ordered Augmentin  -Ordered doxycycline  -Ordered prednisone 40 mg p.o. daily.  Side effects were discussed  -Encouraged tobacco cessation.  Recommended 1 800 quit NOW for free resources  -Ordered referral to establish with primary care  -Return to urgent care any new/worsening symptoms or further questions or concerns.  Patient understood everything discussed.  All questions were answered.      2) systemic inflammatory response secondary to above    3) counseled as described above  -Plan as above          Please note that this dictation was created using voice recognition software. I have made a reasonable attempt to correct obvious errors, but I expect that there are errors of grammar and possibly content that I did not discover before finalizing the note.                [1]   Allergies  Allergen Reactions    Iodine Swelling     \"swelling in my face\"     "

## (undated) DEVICE — GLOVE BIOGEL INDICATOR SZ 8 SURGICAL PF LTX - (50/BX 4BX/CA)

## (undated) DEVICE — PAD PREP 24 X 48 CUFFED - (100/CA)

## (undated) DEVICE — SUTURE 0 VICRYL PLUS CT-1 - 8 X 18 INCH (12/BX)

## (undated) DEVICE — BLOCK

## (undated) DEVICE — MASK ANESTHESIA ADULT  - (100/CA)

## (undated) DEVICE — STAPLER SKIN DISP - (6/BX 10BX/CA) VISISTAT

## (undated) DEVICE — CHLORAPREP 26 ML APPLICATOR - ORANGE TINT(25/CA)

## (undated) DEVICE — PACK LOWER EXTREMITY - (2/CA)

## (undated) DEVICE — SUTURE 3-0 VICRYL PLUS SH - 8X 18 INCH (12/BX)

## (undated) DEVICE — DRAPE C-ARM LARGE 41IN X 74 IN - (10/BX 2BX/CA)

## (undated) DEVICE — DRAPE 36X28IN RAD CARM BND BG - (25/CA) O

## (undated) DEVICE — SLEEVE, VASO, THIGH, MED

## (undated) DEVICE — LACTATED RINGERS INJ 1000 ML - (14EA/CA 60CA/PF)

## (undated) DEVICE — SET EXTENSION WITH 2 PORTS (48EA/CA) ***PART #2C8610 IS A SUBSTITUTE*****

## (undated) DEVICE — SUCTION INSTRUMENT YANKAUER BULBOUS TIP W/O VENT (50EA/CA)

## (undated) DEVICE — PADDING CAST 4 IN STERILE - 4 X 4 YDS (24/CA)

## (undated) DEVICE — TUBE E-T HI-LO CUFF 7.5MM (10EA/PK)

## (undated) DEVICE — HEAD HOLDER JUNIOR/ADULT

## (undated) DEVICE — CANISTER SUCTION 3000ML MECHANICAL FILTER AUTO SHUTOFF MEDI-VAC NONSTERILE LF DISP  (40EA/CA)

## (undated) DEVICE — SODIUM CHL IRRIGATION 0.9% 1000ML (12EA/CA)

## (undated) DEVICE — ELECTRODE DUAL RETURN W/ CORD - (50/PK)

## (undated) DEVICE — KIT ANESTHESIA W/CIRCUIT & 3/LT BAG W/FILTER (20EA/CA)

## (undated) DEVICE — NEPTUNE 4 PORT MANIFOLD - (20/PK)

## (undated) DEVICE — SET LEADWIRE 5 LEAD BEDSIDE DISPOSABLE ECG (1SET OF 5/EA)

## (undated) DEVICE — GLOVE BIOGEL SZ 7.5 SURGICAL PF LTX - (50PR/BX 4BX/CA)

## (undated) DEVICE — KIT ROOM DECONTAMINATION

## (undated) DEVICE — SUTURE 2-0 MONOCRYL CT-1

## (undated) DEVICE — GOWN WARMING STANDARD FLEX - (30/CA)

## (undated) DEVICE — SPLINT PLASTER 5 IN X 30 IN - (50EA/BX 6BX/CA)

## (undated) DEVICE — SENSOR SPO2 NEO LNCS ADHESIVE (20/BX) SEE USER NOTES

## (undated) DEVICE — TUBING CLEARLINK DUO-VENT - C-FLO (48EA/CA)

## (undated) DEVICE — PROTECTOR ULNA NERVE - (36PR/CA)

## (undated) DEVICE — ELECTRODE 850 FOAM ADHESIVE - HYDROGEL RADIOTRNSPRNT (50/PK)

## (undated) DEVICE — SUTURE GENERAL

## (undated) DEVICE — STOCKINET BIAS 4 IN STERILE - (20/CA)